# Patient Record
Sex: MALE | Race: WHITE | Employment: FULL TIME
[De-identification: names, ages, dates, MRNs, and addresses within clinical notes are randomized per-mention and may not be internally consistent; named-entity substitution may affect disease eponyms.]

---

## 2024-07-20 ENCOUNTER — HOSPITAL ENCOUNTER (EMERGENCY)
Facility: HOSPITAL | Age: 39
Discharge: HOME | End: 2024-07-20
Attending: EMERGENCY MEDICINE
Payer: COMMERCIAL

## 2024-07-20 VITALS
HEIGHT: 68 IN | WEIGHT: 164.02 LBS | TEMPERATURE: 97.4 F | RESPIRATION RATE: 16 BRPM | OXYGEN SATURATION: 99 % | SYSTOLIC BLOOD PRESSURE: 153 MMHG | BODY MASS INDEX: 24.86 KG/M2 | HEART RATE: 80 BPM | DIASTOLIC BLOOD PRESSURE: 88 MMHG

## 2024-07-20 DIAGNOSIS — R22.0 RIGHT FACIAL SWELLING: Primary | ICD-10-CM

## 2024-07-20 PROCEDURE — 99283 EMERGENCY DEPT VISIT LOW MDM: CPT

## 2024-07-20 RX ORDER — IBUPROFEN 800 MG/1
800 TABLET ORAL 3 TIMES DAILY PRN
Qty: 90 TABLET | Refills: 0 | Status: SHIPPED | OUTPATIENT
Start: 2024-07-20

## 2024-07-20 ASSESSMENT — PAIN - FUNCTIONAL ASSESSMENT: PAIN_FUNCTIONAL_ASSESSMENT: NONE - DENIES PAIN

## 2024-07-21 NOTE — ED PROVIDER NOTES
[facility] EMERGENCY DEPT  EMERGENCY DEPARTMENT ENCOUNTER      Pt Name: Elbert Jimenez  MRN: [id number]  Birthdate 1985  Date of evaluation: 7/20/2024  Provider: Luisito Nolen MD    CHIEF COMPLAINT       Chief Complaint   Patient presents with    Facial Swelling         HISTORY OF PRESENT ILLNESS   (Location/Symptom, Timing/Onset, Context/Setting, Quality, Duration, Modifying Factors, Severity)  Note limiting factors.   39-year-old male presents to the ER for evaluation for right facial swelling for 5 days.  The patient has been using Tylenol for discomfort as needed.  He denies any fever, headache, sore throat, cough or congestion, neck and back pain, chest pain or shortness of breath, nausea, vomiting, abdominal pain, extremity weakness or numbness, dental pain or swelling, recent history of trauma or fall, prior history of the same.            Review of External Medical Records:     Nursing Notes were reviewed.    REVIEW OF SYSTEMS    (2-9 systems for level 4, 10 or more for level 5)     Review of Systems   All other systems reviewed and are negative.      Except as noted above the remainder of the review of systems was reviewed and negative.       PAST MEDICAL HISTORY   No past medical history on file.      SURGICAL HISTORY     No past surgical history on file.      CURRENT MEDICATIONS       Discharge Medication List as of 7/20/2024  9:13 PM          ALLERGIES     Latex    FAMILY HISTORY     No family history on file.       SOCIAL HISTORY       Social History     Socioeconomic History    Marital status: Single           PHYSICAL EXAM    (up to 7 for level 4, 8 or more for level 5)     ED Triage Vitals [07/20/24 2101]   BP Temp Temp Source Pulse Respirations SpO2 Height Weight - Scale   (!) 153/88 97.4 °F (36.3 °C) Oral 80 16 99 % 1.727 m (5' 8\") 74.4 kg (164 lb 0.4 oz)       Body mass index is 24.94 kg/m².    Physical Exam  Vitals and nursing note reviewed. Exam conducted with a chaperone present.

## 2024-07-21 NOTE — ED TRIAGE NOTES
Pt ambulatory to triage reporting 5 days of R sided facial swelling. Pt denying fevers. Tylenol taken PTA, pt denying pain at this time. Pt speaking in full, clear sentences without difficulty.     Pt stating this has not happened before.    MD assessment in triage